# Patient Record
Sex: MALE | Race: WHITE | HISPANIC OR LATINO | Employment: FULL TIME | URBAN - METROPOLITAN AREA
[De-identification: names, ages, dates, MRNs, and addresses within clinical notes are randomized per-mention and may not be internally consistent; named-entity substitution may affect disease eponyms.]

---

## 2017-01-10 ENCOUNTER — TRANSCRIBE ORDERS (OUTPATIENT)
Dept: LAB | Facility: CLINIC | Age: 22
End: 2017-01-10

## 2017-01-10 ENCOUNTER — APPOINTMENT (OUTPATIENT)
Dept: LAB | Facility: CLINIC | Age: 22
End: 2017-01-10
Payer: COMMERCIAL

## 2017-01-10 ENCOUNTER — GENERIC CONVERSION - ENCOUNTER (OUTPATIENT)
Dept: OTHER | Facility: OTHER | Age: 22
End: 2017-01-10

## 2017-01-10 DIAGNOSIS — A02.0 INTESTINAL INFECTION DUE TO ARIZONA GROUP OF PARACOLON BACILLI: Primary | ICD-10-CM

## 2017-01-10 DIAGNOSIS — Z13.6 SCREENING FOR ISCHEMIC HEART DISEASE: ICD-10-CM

## 2017-01-10 DIAGNOSIS — Z13.6 ENCOUNTER FOR SCREENING FOR CARDIOVASCULAR DISORDERS: ICD-10-CM

## 2017-01-10 LAB
ALBUMIN SERPL BCP-MCNC: 4.4 G/DL (ref 3.5–5)
ALP SERPL-CCNC: 48 U/L (ref 46–116)
ALT SERPL W P-5'-P-CCNC: 39 U/L (ref 12–78)
ANION GAP SERPL CALCULATED.3IONS-SCNC: 6 MMOL/L (ref 4–13)
AST SERPL W P-5'-P-CCNC: 21 U/L (ref 5–45)
BILIRUB SERPL-MCNC: 0.55 MG/DL (ref 0.2–1)
BUN SERPL-MCNC: 15 MG/DL (ref 5–25)
CALCIUM SERPL-MCNC: 9.2 MG/DL (ref 8.3–10.1)
CHLORIDE SERPL-SCNC: 103 MMOL/L (ref 100–108)
CHOLEST SERPL-MCNC: 240 MG/DL (ref 50–200)
CO2 SERPL-SCNC: 32 MMOL/L (ref 21–32)
CREAT SERPL-MCNC: 1.04 MG/DL (ref 0.6–1.3)
ERYTHROCYTE [DISTWIDTH] IN BLOOD BY AUTOMATED COUNT: 13 % (ref 11.6–15.1)
GFR SERPL CREATININE-BSD FRML MDRD: >60 ML/MIN/1.73SQ M
GLUCOSE SERPL-MCNC: 92 MG/DL (ref 65–140)
HCT VFR BLD AUTO: 46.8 % (ref 36.5–49.3)
HDLC SERPL-MCNC: 61 MG/DL (ref 40–60)
HGB BLD-MCNC: 16.1 G/DL (ref 12–17)
LDLC SERPL CALC-MCNC: 151 MG/DL (ref 0–100)
MCH RBC QN AUTO: 30.7 PG (ref 26.8–34.3)
MCHC RBC AUTO-ENTMCNC: 34.4 G/DL (ref 31.4–37.4)
MCV RBC AUTO: 89 FL (ref 82–98)
PLATELET # BLD AUTO: 185 THOUSANDS/UL (ref 149–390)
PMV BLD AUTO: 11.9 FL (ref 8.9–12.7)
POTASSIUM SERPL-SCNC: 4 MMOL/L (ref 3.5–5.3)
PROT SERPL-MCNC: 8 G/DL (ref 6.4–8.2)
RBC # BLD AUTO: 5.24 MILLION/UL (ref 3.88–5.62)
SODIUM SERPL-SCNC: 141 MMOL/L (ref 136–145)
TRIGL SERPL-MCNC: 138 MG/DL
WBC # BLD AUTO: 6.33 THOUSAND/UL (ref 4.31–10.16)

## 2017-01-10 PROCEDURE — 36415 COLL VENOUS BLD VENIPUNCTURE: CPT

## 2017-01-10 PROCEDURE — 80053 COMPREHEN METABOLIC PANEL: CPT

## 2017-01-10 PROCEDURE — 86677 HELICOBACTER PYLORI ANTIBODY: CPT

## 2017-01-10 PROCEDURE — 80061 LIPID PANEL: CPT

## 2017-01-10 PROCEDURE — 85027 COMPLETE CBC AUTOMATED: CPT

## 2017-01-11 LAB — H PYLORI IGM SER-ACNC: <9 UNITS (ref 0–8.9)

## 2017-01-12 ENCOUNTER — GENERIC CONVERSION - ENCOUNTER (OUTPATIENT)
Dept: OTHER | Facility: OTHER | Age: 22
End: 2017-01-12

## 2017-01-12 LAB — H PYLORI IGG SER IA-ACNC: <0.9 U/ML (ref 0–0.8)

## 2017-01-13 LAB — MISCELLANEOUS LAB TEST RESULT: NORMAL

## 2017-01-16 ENCOUNTER — GENERIC CONVERSION - ENCOUNTER (OUTPATIENT)
Dept: OTHER | Facility: OTHER | Age: 22
End: 2017-01-16

## 2017-08-18 ENCOUNTER — ALLSCRIPTS OFFICE VISIT (OUTPATIENT)
Dept: OTHER | Facility: OTHER | Age: 22
End: 2017-08-18

## 2017-08-18 DIAGNOSIS — M41.9 SCOLIOSIS: ICD-10-CM

## 2017-08-21 ENCOUNTER — TRANSCRIBE ORDERS (OUTPATIENT)
Dept: ADMINISTRATIVE | Facility: HOSPITAL | Age: 22
End: 2017-08-21

## 2017-08-21 ENCOUNTER — HOSPITAL ENCOUNTER (OUTPATIENT)
Dept: RADIOLOGY | Facility: HOSPITAL | Age: 22
Discharge: HOME | End: 2017-08-21
Payer: COMMERCIAL

## 2017-08-21 DIAGNOSIS — M41.9 SCOLIOSIS: ICD-10-CM

## 2017-08-21 PROCEDURE — 72082 X-RAY EXAM ENTIRE SPI 2/3 VW: CPT

## 2017-08-25 ENCOUNTER — GENERIC CONVERSION - ENCOUNTER (OUTPATIENT)
Dept: OTHER | Facility: OTHER | Age: 22
End: 2017-08-25

## 2017-09-08 ENCOUNTER — GENERIC CONVERSION - ENCOUNTER (OUTPATIENT)
Dept: OTHER | Facility: OTHER | Age: 22
End: 2017-09-08

## 2017-09-27 ENCOUNTER — ALLSCRIPTS OFFICE VISIT (OUTPATIENT)
Dept: OTHER | Facility: OTHER | Age: 22
End: 2017-09-27

## 2017-12-01 ENCOUNTER — OFFICE VISIT (OUTPATIENT)
Dept: URGENT CARE | Age: 22
End: 2017-12-01
Payer: COMMERCIAL

## 2017-12-01 ENCOUNTER — APPOINTMENT (OUTPATIENT)
Dept: RADIOLOGY | Age: 22
End: 2017-12-01
Attending: PHYSICIAN ASSISTANT
Payer: COMMERCIAL

## 2017-12-01 DIAGNOSIS — S99.912A INJURY OF LEFT ANKLE: ICD-10-CM

## 2017-12-01 PROCEDURE — 73610 X-RAY EXAM OF ANKLE: CPT

## 2017-12-01 PROCEDURE — 99213 OFFICE O/P EST LOW 20 MIN: CPT | Performed by: FAMILY MEDICINE

## 2017-12-05 NOTE — PROGRESS NOTES
Assessment    1  Sprain of left ankle, initial encounter (845 00) (Z81 360A)    Plan  Left ankle injury    · * XR ANKLE 3+ VIEW LEFT; Status:Active - Retrospective By Protocol Authorization; Requested for:[id number];   Muscle spasms of neck    · Ibuprofen 400 MG Oral Tablet    Discussion/Summary  Discussion Summary:   X-ray does not show any acute bony changes  Recommend rest, ice, compression, elevation for the 1st 24-48 hours  Weightbearing as tolerated  Starting tomorrow would recommend alphabet exercises for range of motion left ankle  Symptoms should slowly improve over the next 3-7 days  If worsening symptoms, seek further evaluation  Understands and agrees with treatment plan: The treatment plan was reviewed with the patient/guardian  The patient/guardian understands and agrees with the treatment plan   Counseling Documentation With Imm: The patient was counseled regarding diagnostic results,-- instructions for management  Chief Complaint  Chief Complaint Free Text Note Form: upon walking to work he rolled the left ankle over uneven sidewalk,this am 8302 ES tylenolo  no other txswelling noted    History of Present Illness  HPI: A 55-year-old male who twisted his left ankle this morning while getting out of his car at work  It hurt for a couple seconds but he was able to walk on it  He notes that it is swollen and a little sore to walk on  He wanted to get it further evaluated  He does have a history of previous sprains  He has taken some ibuprofen  Hospital Based Practices Required Assessment:  Abuse And Domestic Violence Screen   Yes, the patient is safe at home  -- The patient states no one is hurting them  Depression And Suicide Screen  No, the patient has not had thoughts of hurting themself  No, the patient has not felt depressed in the past 7 days  Review of Systems  Focused-Male:  Constitutional: no fever or chills, feels well, no tiredness, no recent weight loss or weight gain  Musculoskeletal: arthralgias-- and-- joint swelling  Integumentary: no complaints of skin rash or lesion, no itching or dry skin, no skin wounds  Neurological: no complaints of headache, no confusion, no numbness or tingling, no dizziness or fainting  Active Problems  1  Annual physical exam (V70 0) (Z00 00)   2  Encounter for screening for cardiovascular disorders (V81 2) (Z13 6)   3  Left ankle injury (959 7) (R55 511O)   4  Muscle spasms of neck (728 85) (M62 838)   5  Scoliosis (737 30) (M41 9)   6  Tension headache (307 81) (G44 209)    Past Medical History    1  History of Adolescent scoliosis (737 39) (T33 660)  Active Problems And Past Medical History Reviewed: The active problems and past medical history were reviewed and updated today  Family History  Mother    1  Denied: Family history of substance abuse   2  Family history of Helicobacter pylori (H  pylori) infection   3  Denied: Family history of Mental illness in member of household  Father    4  Denied: Family history of substance abuse   5  Denied: Family history of Mental illness in member of household  Grandparent    6  Denied: Family history of substance abuse   7  Denied: Family history of Mental illness in member of household  Family History Reviewed: The family history was reviewed and updated today  Social History   · Never A Smoker  Social History Reviewed: The social history was reviewed and updated today  Surgical History    1  History of Oral Surgery Tooth Extraction Clayton Tooth  Surgical History Reviewed: The surgical history was reviewed and updated today  Current Meds   1  Ibuprofen 400 MG Oral Tablet; TAKE 1 TABLET 3 TIMES DAILY AS NEEDED; Therapy: [id number] to (Evaluate:79Jva4595); Last Rx:[id number] Ordered  Medication List Reviewed: The medication list was reviewed and updated today  Allergies    1   No Known Drug Allergies    Vitals  Signs   Recorded: [id number] 12:26PM   Temperature: 97 8 F  Heart Rate: 72  Respiration: 20  Systolic: 809  Diastolic: 70  Height: 5 ft 10 in  Weight: 190 lb   BMI Calculated: 27 26  BSA Calculated: 2 04  O2 Saturation: 97  Pain Scale: 4    Physical Exam   Constitutional Well-developed well-nourished male in no acute distress  Mild antalgic gait  Pulmonary  Respiratory effort: No increased work of breathing or signs of respiratory distress  Musculoskeletal  Gait and station: Abnormal  -- mild antalgic  Inspection/palpation of joints, bones, and muscles: Abnormal  -- swelling along the lateral malleolus with tenderness to palpation over the lateral malleolus and lateral ligaments  Negative anterior drawer  Good range of motion with plantar and dorsiflexion without pain  Some pain with inversion maneuver  Skin No acute contusions  Psychiatric  Orientation to person, place and time: Normal    Mood and affect: Normal        Results/Data  Diagnostic Studies Reviewed: I personally reviewed the films/images/results in the office today  My interpretation follows  X-ray Review Left ankle without acute bony changes  Await radiologist's final results  Message  Return to work or school:   Jessenia Huitron is under my professional care  He was seen in my office on 12/1/17             Signatures   Electronically signed by :  Tita Ronquillo, [facility]; Dec  1 2017  1:28PM EST                       (Author)    Electronically signed by : Ruthie Lazcano DO; Dec  1 2017  4:37PM EST                       (Co-author)

## 2018-01-09 NOTE — RESULT NOTES
Message   H  pylori is negative, hyperlipidemia: needs to follow-up on hyperlipidemia     Verified Results  (1) HELICOBACTER PYLORI, IGM [id number] 11:04AM Kaye Pradhan     Test Name Result Flag Reference   HELICOBACTER PYLORI, IGM <9 0 units  0 0 - 8 9   Negative          <9 0                                  Equivocal   9 0 - 11 0                                  Positive         >11 0  This test was developed and its performance characteristics  determined by [facility]  It has not been cleared or approved  by the Food and Drug Administration  Performed at:  [address] [place] [address]  [id number]  : Shai Bennett MD, Phone:  [phone]     (1) CBC/ PLT (NO DIFF) [id number] 10:41AM Kaye Pradhan     Test Name Result Flag Reference   HEMATOCRIT 46 8 %  36 5-49 3   HEMOGLOBIN 16 1 g/dL  12 0-17 0   MCHC 34 4 g/dL  31 4-37 4   MCH 30 7 pg  26 8-34 3   MCV 89 fL  82-98   PLATELET COUNT 986 Thousands/uL  149-390   RBC COUNT 5 24 Million/uL  3 88-5 62   RDW 13 0 %  11 6-15 1   WBC COUNT 6 33 Thousand/uL  4 31-10 16   MPV 11 9 fL  8 9-12 7     (1) COMPREHENSIVE METABOLIC PANEL [id number] 97:07UI Kaye Pradhan     Test Name Result Flag Reference   GLUCOSE,RANDM 92 mg/dL     If the patient is fasting, the ADA then defines impaired fasting glucose as > 100 mg/dL and diabetes as > or equal to 123 mg/dL     SODIUM 141 mmol/L  136-145   POTASSIUM 4 0 mmol/L  3 5-5 3   CHLORIDE 103 mmol/L  100-108   CARBON DIOXIDE 32 mmol/L  21-32   ANION GAP (CALC) 6 mmol/L  4-13   BLOOD UREA NITROGEN 15 mg/dL  5-25   CREATININE 1 04 mg/dL  0 60-1 30   Standardized to IDMS reference method   CALCIUM 9 2 mg/dL  8 3-10 1   BILI, TOTAL 0 55 mg/dL  0 20-1 00   ALK PHOSPHATAS 48 U/L     ALT (SGPT) 39 U/L  12-78   AST(SGOT) 21 U/L  5-45   ALBUMIN 4 4 g/dL  3 5-5 0   TOTAL PROTEIN 8 0 g/dL  6 4-8 2   eGFR Non-African American      >60 0 ml/min/1 73sq m   [employer] Disease Education Program recommendations are as follows:  GFR calculation is accurate only with a steady state creatinine  Chronic Kidney disease less than 60 ml/min/1 73 sq  meters  Kidney failure less than 15 ml/min/1 73 sq  meters  (1) LIPID PANEL, FASTING [id number] 10:41AM Kesha Elise     Test Name Result Flag Reference   CHOLESTEROL 240 mg/dL H    HDL,DIRECT 61 mg/dL H 40-60   Specimen collection should occur prior to Metamizole administration due to the potential for falsely depressed results  LDL CHOLESTEROL CALCULATED 151 mg/dL H 0-100   Triglyceride:         Normal              <150 mg/dl       Borderline High    150-199 mg/dl       High               200-499 mg/dl       Very High          >499 mg/dl  Cholesterol:         Desirable        <200 mg/dl      Borderline High  200-239 mg/dl      High             >239 mg/dl  HDL Cholesterol:        High    >59 mg/dL      Low     <41 mg/dL  LDL CALCULATED:    This screening LDL is a calculated result  It does not have the accuracy of the Direct Measured LDL in the monitoring of patients with hyperlipidemia and/or statin therapy  Direct Measure LDL (BZF760) must be ordered separately in these patients  TRIGLYCERIDES 138 mg/dL  <=150   Specimen collection should occur prior to N-Acetylcysteine or Metamizole administration due to the potential for falsely depressed results

## 2018-01-10 NOTE — RESULT NOTES
Discussion/Summary   Please call pt to see if he was able to get a copy of his previous xray spine for comparison  Also advised fup to discuss results  Verified Results  XR ENTIRE SPINE 2-3 VIEW ( scoliosis) [id number] 06:33PM Riley Hopkins Order Number: [id number]     Test Name Result Flag Reference   XR ENTIRE SPINE 2-3 VW (scoliosis) (Report)     SCOLIOSIS      INDICATION: Idiopathic scoliosis  COMPARISON: None     VIEWS: Erect PA and lateral views thoracolumbar spine     IMAGES: 8     FINDINGS:     There is no fracture, pathologic bone lesion or congenital segmentation anomaly  Proximally 40 degrees of dextroscoliosis is present about T7-8  Approximately 32 degrees of levoscoliosis is present about L1-2  IMPRESSION:     S shaped scoliosis as described  Workstation performed: [id number]     Signed by:    Fco Moreno MD   8/24/17

## 2018-01-10 NOTE — RESULT NOTES
Message   Can you  check which  written report they are talking about     Verified Results  (1) SPECIAL TEST [id number] 12:19PM Kaye Pradhan     Test Name Result Flag Reference   TEST RESULT      SEE WRITTEN REPORT FROM [facility]

## 2018-01-12 VITALS
DIASTOLIC BLOOD PRESSURE: 75 MMHG | BODY MASS INDEX: 27.35 KG/M2 | WEIGHT: 191 LBS | HEART RATE: 96 BPM | SYSTOLIC BLOOD PRESSURE: 110 MMHG | HEIGHT: 70 IN

## 2018-01-12 NOTE — RESULT NOTES
Verified Results  (1) HELIOBACTER  PYLORI IGG [id number] 11:04AM Ahmet Ewing     Test Name Result Flag Reference   H   PYLORI IGG <0 9 U/mL  0 0 - 0 8   Negative            <0 9                               Indeterminate  0 9 - 1 0                               Positive            >1 0  Performed at:  [address]  [id number]  : Gunner Blackwell MD, Phone:  [phone]       Discussion/Summary   H Pylori negative

## 2018-01-14 VITALS
BODY MASS INDEX: 27.06 KG/M2 | WEIGHT: 189 LBS | RESPIRATION RATE: 16 BRPM | SYSTOLIC BLOOD PRESSURE: 124 MMHG | HEART RATE: 76 BPM | HEIGHT: 70 IN | DIASTOLIC BLOOD PRESSURE: 72 MMHG

## 2018-01-22 VITALS
BODY MASS INDEX: 27.4 KG/M2 | HEART RATE: 78 BPM | OXYGEN SATURATION: 98 % | HEIGHT: 70 IN | RESPIRATION RATE: 17 BRPM | SYSTOLIC BLOOD PRESSURE: 116 MMHG | DIASTOLIC BLOOD PRESSURE: 80 MMHG | WEIGHT: 191.38 LBS

## 2018-01-22 VITALS
SYSTOLIC BLOOD PRESSURE: 120 MMHG | HEART RATE: 78 BPM | RESPIRATION RATE: 16 BRPM | WEIGHT: 192 LBS | DIASTOLIC BLOOD PRESSURE: 70 MMHG | HEIGHT: 70 IN | BODY MASS INDEX: 27.49 KG/M2

## 2018-01-23 VITALS
WEIGHT: 190 LBS | HEART RATE: 72 BPM | DIASTOLIC BLOOD PRESSURE: 70 MMHG | TEMPERATURE: 97.8 F | RESPIRATION RATE: 20 BRPM | OXYGEN SATURATION: 97 % | BODY MASS INDEX: 27.2 KG/M2 | SYSTOLIC BLOOD PRESSURE: 144 MMHG | HEIGHT: 70 IN

## 2018-01-24 NOTE — MISCELLANEOUS
Message  Return to work or school:   Edna Tomlin is under my professional care  He was seen in my office on 12/1/17             Signatures   Electronically signed by :  Areli Jett, [facility]; Dec  1 2017  1:28PM EST                       (Author)

## 2018-10-03 ENCOUNTER — OFFICE VISIT (OUTPATIENT)
Dept: URGENT CARE | Age: 23
End: 2018-10-03
Payer: COMMERCIAL

## 2018-10-03 VITALS
DIASTOLIC BLOOD PRESSURE: 64 MMHG | HEIGHT: 70 IN | SYSTOLIC BLOOD PRESSURE: 133 MMHG | OXYGEN SATURATION: 99 % | BODY MASS INDEX: 25.77 KG/M2 | WEIGHT: 180 LBS | HEART RATE: 80 BPM | TEMPERATURE: 98.4 F | RESPIRATION RATE: 20 BRPM

## 2018-10-03 DIAGNOSIS — J01.90 ACUTE SINUSITIS, RECURRENCE NOT SPECIFIED, UNSPECIFIED LOCATION: Primary | ICD-10-CM

## 2018-10-03 PROCEDURE — 99213 OFFICE O/P EST LOW 20 MIN: CPT | Performed by: FAMILY MEDICINE

## 2018-10-03 RX ORDER — AMOXICILLIN AND CLAVULANATE POTASSIUM 875; 125 MG/1; MG/1
1 TABLET, FILM COATED ORAL EVERY 12 HOURS SCHEDULED
Qty: 20 TABLET | Refills: 0 | Status: SHIPPED | OUTPATIENT
Start: 2018-10-03 | End: 2018-10-13

## 2018-10-04 NOTE — PROGRESS NOTES
[facility]        NAME: Ben Headley is a 21 y o  male  : 1995    MRN: [id number]  DATE: October 3, 2018  TIME: 10:46 PM    Assessment and Plan   Acute sinusitis, recurrence not specified, unspecified location [J01 90]  1  Acute sinusitis, recurrence not specified, unspecified location  amoxicillin-clavulanate (AUGMENTIN) 875-125 mg per tablet         Patient Instructions       Follow up with PCP in 3-5 days  Proceed to  ER if symptoms worsen  Chief Complaint     Chief Complaint   Patient presents with    Cough     body ache and head congestion for 2 days   Headache         History of Present Illness       Patient here for evaluation of congestion, headache, thick green yellow discharge, occasional cough  Review of Systems   Review of Systems   Constitutional: Positive for chills and fever  Eyes: Negative  Current Medications       Current Outpatient Prescriptions:     Fexofenadine HCl (MUCINEX ALLERGY PO), Take by mouth, Disp: , Rfl:     amoxicillin-clavulanate (AUGMENTIN) 875-125 mg per tablet, Take 1 tablet by mouth every 12 (twelve) hours for 10 days, Disp: 20 tablet, Rfl: 0    Current Allergies     Allergies as of 10/03/2018    (No Known Allergies)            The following portions of the patient's history were reviewed and updated as appropriate: allergies, current medications, past family history, past medical history, past social history, past surgical history and problem list      History reviewed  No pertinent past medical history  Past Surgical History:   Procedure Laterality Date    WISDOM TOOTH EXTRACTION         History reviewed  No pertinent family history  Medications have been verified          Objective   /64   Pulse 80   Temp 98 4 °F (36 9 °C) (Temporal)   Resp 20   Ht 5' 10" (1 778 m)   Wt 81 6 kg (180 lb)   SpO2 99%   BMI 25 83 kg/m²        Physical Exam     Physical Exam   Constitutional: He is oriented to person, place, and time  He appears well-developed and well-nourished  No distress  HENT:   Head: Normocephalic and atraumatic  Right Ear: External ear normal    Left Ear: External ear normal    Bilateral nasal congestion erythema with mucopurulent drainage  Bilateral tonsillar erythema with no soft tissue swelling  No exudate  Eyes: Pupils are equal, round, and reactive to light  Conjunctivae and EOM are normal    Cardiovascular: Normal rate, regular rhythm and normal heart sounds  No murmur heard  Pulmonary/Chest: Effort normal and breath sounds normal  No respiratory distress  He has no wheezes  He has no rales  Lymphadenopathy:     He has cervical adenopathy  Neurological: He is alert and oriented to person, place, and time  Skin: Skin is warm and dry  Psychiatric: He has a normal mood and affect  His behavior is normal    Nursing note and vitals reviewed

## 2019-02-26 ENCOUNTER — OFFICE VISIT (OUTPATIENT)
Dept: FAMILY MEDICINE CLINIC | Facility: CLINIC | Age: 24
End: 2019-02-26
Payer: COMMERCIAL

## 2019-02-26 VITALS
HEART RATE: 94 BPM | RESPIRATION RATE: 18 BRPM | OXYGEN SATURATION: 97 % | BODY MASS INDEX: 25.25 KG/M2 | DIASTOLIC BLOOD PRESSURE: 70 MMHG | HEIGHT: 70 IN | TEMPERATURE: 98 F | WEIGHT: 176.4 LBS | SYSTOLIC BLOOD PRESSURE: 124 MMHG

## 2019-02-26 DIAGNOSIS — J00 ACUTE NASOPHARYNGITIS: Primary | ICD-10-CM

## 2019-02-26 PROBLEM — M41.9 SCOLIOSIS: Status: ACTIVE | Noted: 2017-08-18

## 2019-02-26 PROBLEM — J01.90 ACUTE SINUSITIS: Status: RESOLVED | Noted: 2018-10-03 | Resolved: 2019-02-26

## 2019-02-26 PROCEDURE — 1036F TOBACCO NON-USER: CPT | Performed by: PHYSICIAN ASSISTANT

## 2019-02-26 PROCEDURE — 99213 OFFICE O/P EST LOW 20 MIN: CPT | Performed by: PHYSICIAN ASSISTANT

## 2019-02-26 PROCEDURE — 3008F BODY MASS INDEX DOCD: CPT | Performed by: PHYSICIAN ASSISTANT

## 2019-02-26 NOTE — PROGRESS NOTES
Assessment/Plan:     Diagnoses and all orders for this visit:    Acute nasopharyngitis  - advised to continue OTC supportive care with Mucinex and saline gargle  - Recommended 600 mg Motrin for body aches  - encouraged rest and fluid intake   - educated Pt that cough can take 10-14 days total to resolve  - directed to return if fever over 102, symptoms persist for 14 days, thick productive cough          Subjective:    Patient ID: Viral Hickey is a 21 y o  male  Pt is presenting today for Sore throat, productive cough, body aches, chills, for 2 days  He is here today because he does of his raspy voice and chest tightness  Denies any SOB  He has been taking Mucinex and Nyquil  He took Tylenol last night for headache which resolved completely  No asthma, COPD or smoking  He has sick contacts at work  He did not receive flu vaccination this season  URI    There has been no fever  Associated symptoms include congestion, coughing, headaches, joint pain and sinus pain  Pertinent negatives include no diarrhea, nausea, rhinorrhea or vomiting  He has tried acetaminophen and decongestant for the symptoms  The following portions of the patient's history were reviewed and updated as appropriate: allergies, current medications and problem list     Review of Systems   HENT: Positive for congestion and sinus pain  Negative for rhinorrhea  Respiratory: Positive for cough  Gastrointestinal: Negative for diarrhea, nausea and vomiting  Musculoskeletal: Positive for joint pain  Neurological: Positive for headaches  Objective:  /70 (BP Location: Left arm, Patient Position: Sitting, Cuff Size: Adult)   Pulse 94   Temp 98 °F (36 7 °C)   Resp 18   Ht 5' 10" (1 778 m)   Wt 80 kg (176 lb 6 4 oz)   SpO2 97%   BMI 25 31 kg/m²      Physical Exam   Constitutional: He is oriented to person, place, and time  He appears well-developed and well-nourished     HENT:   Head: Normocephalic and atraumatic  Right Ear: Tympanic membrane, external ear and ear canal normal    Left Ear: Tympanic membrane, external ear and ear canal normal    Nose: Nose normal  No rhinorrhea  Mouth/Throat: Oropharynx is clear and moist  No oropharyngeal exudate or posterior oropharyngeal erythema  Cardiovascular: Normal rate, regular rhythm and normal heart sounds  Exam reveals no gallop and no friction rub  No murmur heard  Pulmonary/Chest: Effort normal and breath sounds normal  He has no wheezes  He has no rales  Lymphadenopathy:        Head (right side): No submental, no submandibular, no tonsillar, no preauricular and no posterior auricular adenopathy present  Head (left side): No submental, no submandibular, no tonsillar, no preauricular and no posterior auricular adenopathy present  He has no cervical adenopathy  Neurological: He is alert and oriented to person, place, and time  Skin: Skin is warm and dry  Psychiatric: He has a normal mood and affect  His behavior is normal  Thought content normal    Vitals reviewed

## 2019-08-19 ENCOUNTER — OFFICE VISIT (OUTPATIENT)
Dept: FAMILY MEDICINE CLINIC | Facility: CLINIC | Age: 24
End: 2019-08-19
Payer: COMMERCIAL

## 2019-08-19 VITALS
BODY MASS INDEX: 24.22 KG/M2 | HEIGHT: 70 IN | WEIGHT: 169.2 LBS | TEMPERATURE: 98.3 F | HEART RATE: 72 BPM | RESPIRATION RATE: 18 BRPM | DIASTOLIC BLOOD PRESSURE: 78 MMHG | SYSTOLIC BLOOD PRESSURE: 122 MMHG | OXYGEN SATURATION: 98 %

## 2019-08-19 DIAGNOSIS — H10.13 ALLERGIC CONJUNCTIVITIS OF BOTH EYES: Primary | ICD-10-CM

## 2019-08-19 DIAGNOSIS — Z23 ENCOUNTER FOR IMMUNIZATION: ICD-10-CM

## 2019-08-19 PROCEDURE — 1036F TOBACCO NON-USER: CPT | Performed by: PHYSICIAN ASSISTANT

## 2019-08-19 PROCEDURE — 90715 TDAP VACCINE 7 YRS/> IM: CPT | Performed by: PHYSICIAN ASSISTANT

## 2019-08-19 PROCEDURE — 3008F BODY MASS INDEX DOCD: CPT | Performed by: PHYSICIAN ASSISTANT

## 2019-08-19 PROCEDURE — 90471 IMMUNIZATION ADMIN: CPT | Performed by: PHYSICIAN ASSISTANT

## 2019-08-19 PROCEDURE — 99214 OFFICE O/P EST MOD 30 MIN: CPT | Performed by: PHYSICIAN ASSISTANT

## 2019-08-19 RX ORDER — OLOPATADINE HYDROCHLORIDE 1 MG/ML
1 SOLUTION/ DROPS OPHTHALMIC 2 TIMES DAILY
Qty: 5 ML | Refills: 0 | Status: SHIPPED | OUTPATIENT
Start: 2019-08-19

## 2019-08-19 NOTE — PROGRESS NOTES
Assessment/Plan:     Diagnoses and all orders for this visit:    Allergic conjunctivitis of both eyes  Sneezing and mild eye redness  History of allergies  No eye matting or discharge  - Ordered Panatol drops in both eyes BID  - Directed to use warm compress for symptom relief  - Recommended frequent handwashing, wash pillow case, continue with glasses  - Directed Pt to go to Eye doctor if redness/pain continues despite antibiotic for 2-3 days, blurry vision, photophobia, fever over 102, severe HA  -     olopatadine (PATANOL) 0 1 % ophthalmic solution; Administer 1 drop to both eyes 2 (two) times a day    Encounter for immunization  -     TDAP VACCINE GREATER THAN OR EQUAL TO 8YO IM            Subjective:    Patient ID: Sary Allen is a 25 y o  male  Pt is presenting today for Bilateral eye itchiness, dryness with left eye redness for 4 days  Increased sneezing    Denies any eye matting  He stopped wearing contacts and has been wearing glasses    Conjunctivitis    Episode onset: 4 days ago  Associated symptoms include eye itching and eye redness  Pertinent negatives include no decreased vision, no double vision, no photophobia, no ear discharge, no ear pain, no rhinorrhea, no sore throat and no eye discharge  The left eye is affected  The following portions of the patient's history were reviewed and updated as appropriate: allergies, current medications and problem list     Review of Systems   HENT: Negative for ear discharge, ear pain, rhinorrhea and sore throat  Eyes: Positive for redness and itching  Negative for double vision, photophobia and discharge  Objective:  /78   Pulse 72   Temp 98 3 °F (36 8 °C)   Resp 18   Ht 5' 10" (1 778 m)   Wt 76 7 kg (169 lb 3 2 oz)   SpO2 98%   BMI 24 28 kg/m²      Physical Exam   Constitutional: He is oriented to person, place, and time  He appears well-developed and well-nourished  HENT:   Head: Normocephalic and atraumatic     Right Ear: Tympanic membrane, external ear and ear canal normal    Left Ear: Tympanic membrane, external ear and ear canal normal    Nose: Nose normal  No rhinorrhea  Mouth/Throat: Oropharynx is clear and moist  No oropharyngeal exudate or posterior oropharyngeal erythema  Eyes: Pupils are equal, round, and reactive to light  EOM are normal  Right eye exhibits no discharge  Left eye exhibits no discharge  Right conjunctiva is injected  Left conjunctiva is injected  Cardiovascular: Normal rate, regular rhythm and normal heart sounds  Exam reveals no gallop and no friction rub  No murmur heard  Pulmonary/Chest: Effort normal and breath sounds normal  He has no wheezes  He has no rales  Lymphadenopathy:        Head (right side): No submental, no submandibular, no tonsillar, no preauricular and no posterior auricular adenopathy present  Head (left side): No submental, no submandibular, no tonsillar, no preauricular and no posterior auricular adenopathy present  He has no cervical adenopathy  Neurological: He is alert and oriented to person, place, and time  Skin: Skin is warm and dry  Psychiatric: He has a normal mood and affect  His behavior is normal  Thought content normal    Vitals reviewed